# Patient Record
Sex: FEMALE | Race: WHITE | NOT HISPANIC OR LATINO | Employment: OTHER | URBAN - METROPOLITAN AREA
[De-identification: names, ages, dates, MRNs, and addresses within clinical notes are randomized per-mention and may not be internally consistent; named-entity substitution may affect disease eponyms.]

---

## 2019-01-24 ENCOUNTER — OFFICE VISIT (OUTPATIENT)
Dept: OBSTETRICS AND GYNECOLOGY | Facility: CLINIC | Age: 54
End: 2019-01-24

## 2019-01-24 VITALS
HEIGHT: 64 IN | BODY MASS INDEX: 29.37 KG/M2 | WEIGHT: 172 LBS | SYSTOLIC BLOOD PRESSURE: 124 MMHG | DIASTOLIC BLOOD PRESSURE: 82 MMHG

## 2019-01-24 DIAGNOSIS — Z01.419 PAP SMEAR, LOW-RISK: Primary | ICD-10-CM

## 2019-01-24 DIAGNOSIS — Z11.51 SPECIAL SCREENING EXAMINATION FOR HUMAN PAPILLOMAVIRUS (HPV): ICD-10-CM

## 2019-01-24 DIAGNOSIS — Z13.9 SCREENING FOR CONDITION: ICD-10-CM

## 2019-01-24 DIAGNOSIS — N94.2 VAGINISMUS: ICD-10-CM

## 2019-01-24 DIAGNOSIS — N95.2 VAGINAL ATROPHY: ICD-10-CM

## 2019-01-24 PROBLEM — J30.2 SEASONAL ALLERGIES: Status: ACTIVE | Noted: 2019-01-24

## 2019-01-24 LAB
BILIRUB BLD-MCNC: NEGATIVE MG/DL
CLARITY, POC: CLEAR
COLOR UR: YELLOW
GLUCOSE UR STRIP-MCNC: NEGATIVE MG/DL
KETONES UR QL: NEGATIVE
LEUKOCYTE EST, POC: NEGATIVE
NITRITE UR-MCNC: NEGATIVE MG/ML
PH UR: 6 [PH] (ref 5–8)
PROT UR STRIP-MCNC: NEGATIVE MG/DL
RBC # UR STRIP: NEGATIVE /UL
SP GR UR: 1.03 (ref 1–1.03)
UROBILINOGEN UR QL: NORMAL

## 2019-01-24 PROCEDURE — 99386 PREV VISIT NEW AGE 40-64: CPT | Performed by: OBSTETRICS & GYNECOLOGY

## 2019-01-24 PROCEDURE — 81002 URINALYSIS NONAUTO W/O SCOPE: CPT | Performed by: OBSTETRICS & GYNECOLOGY

## 2019-01-24 PROCEDURE — 99213 OFFICE O/P EST LOW 20 MIN: CPT | Performed by: OBSTETRICS & GYNECOLOGY

## 2019-01-24 RX ORDER — CETIRIZINE HYDROCHLORIDE 10 MG/1
10 TABLET ORAL DAILY
COMMUNITY

## 2019-01-24 RX ORDER — ERGOCALCIFEROL (VITAMIN D2) 10 MCG
400 TABLET ORAL DAILY
COMMUNITY
End: 2021-03-17

## 2019-01-24 RX ORDER — UBIDECARENONE 75 MG
50 CAPSULE ORAL DAILY
COMMUNITY
End: 2021-03-17

## 2019-01-24 RX ORDER — INFLUENZA A VIRUS A/SINGAPORE/GP1908/2015 IVR-180 (H1N1) ANTIGEN (MDCK CELL DERIVED, PROPIOLACTONE INACTIVATED), INFLUENZA A VIRUS A/NORTH CAROLINA/04/2016 (H3N2) HEMAGGLUTININ ANTIGEN (MDCK CELL DERIVED, PROPIOLACTONE INACTIVATED), INFLUENZA B VIRUS B/IOWA/06/2017 HEMAGGLUTININ ANTIGEN (MDCK CELL DERIVED, PROPIOLACTONE INACTIVATED), INFLUENZA B VIRUS B/SINGAPORE/INFTT-16-0610/2016 HEMAGGLUTININ ANTIGEN (MDCK CELL DERIVED, PROPIOLACTONE INACTIVATED) 15; 15; 15; 15 UG/.5ML; UG/.5ML; UG/.5ML; UG/.5ML
INJECTION, SUSPENSION INTRAMUSCULAR
Refills: 0 | COMMUNITY
Start: 2018-10-17 | End: 2021-03-17

## 2019-01-24 RX ORDER — ESTRADIOL 0.1 MG/G
1 CREAM VAGINAL 2 TIMES WEEKLY
Qty: 45 G | Refills: 6 | Status: SHIPPED | OUTPATIENT
Start: 2019-01-24 | End: 2020-02-07

## 2019-01-24 NOTE — PROGRESS NOTES
GYN Annual Exam     CC- Here for annual exam.     Rosemary Sharma is a 53 y.o. female new patient who presents for annual well woman exam. She had an ablation in  and had regular but lighter cycles. Stopped cycles 2 years ago. She is not any HRT. She have severe pain with sex. Pain is worse in the front. They have tried lubricant without much benefit.       OB History      Para Term  AB Living    1       1      SAB TAB Ectopic Molar Multiple Live Births      1                Obstetric Comments    1 VIP          Menarche:10  Menopause:51  HRT:none  Current contraception: post menopausal status and tubal ligation  History of abnormal Pap smear: no  History of abnormal mammogram: no  Family history of uterine, colon or ovarian cancer: yes - MGF colon > 50  Family history of breast cancer: no  STD's: none  Last pap smear:> 5 years  Ablation        Health Maintenance   Topic Date Due   • ANNUAL PHYSICAL  1966   • TDAP/TD VACCINES (1 - Tdap) 1982   • ZOSTER VACCINE (1 of 2) 2013   • INFLUENZA VACCINE  2018   • HEPATITIS C SCREENING  2019   • PAP SMEAR  2019   • COLONOSCOPY  2019       Past Medical History:   Diagnosis Date   • Seasonal allergies 2019       Past Surgical History:   Procedure Laterality Date   • CYST REMOVAL      arm & leg   • D&C WITH SUCTION      VIP x 1   • ENDOMETRIAL ABLATION     • EYE SURGERY     • TUBAL ABDOMINAL LIGATION           Current Outpatient Medications:   •  cetirizine (zyrTEC) 10 MG tablet, Take 10 mg by mouth Daily., Disp: , Rfl:   •  vitamin B-12 (CYANOCOBALAMIN) 100 MCG tablet, Take 50 mcg by mouth Daily., Disp: , Rfl:   •  Vitamin D, Cholecalciferol, (CHOLECALCIFEROL) 400 units tablet, Take 400 Units by mouth Daily., Disp: , Rfl:   •  vitamin E 100 UNIT capsule, Take 100 Units by mouth Daily., Disp: , Rfl:   •  estradiol (ESTRACE) 0.1 MG/GM vaginal cream, Insert 1 g into the vagina 2 (Two) Times a Week., Disp: 45 g,  "Rfl: 6  •  FLUCELVAX QUADRIVALENT 0.5 ML suspension prefilled syringe injection, TO BE ADMINISTERED BY PHARMACIST FOR IMMUNIZATION, Disp: , Rfl: 0    No Known Allergies    Social History     Tobacco Use   • Smoking status: Never Smoker   • Smokeless tobacco: Never Used   Substance Use Topics   • Alcohol use: No     Frequency: Never   • Drug use: No       Family History   Problem Relation Age of Onset   • Colon cancer Maternal Grandfather    • Breast cancer Neg Hx    • Ovarian cancer Neg Hx    • Deep vein thrombosis Neg Hx    • Pulmonary embolism Neg Hx        Review of Systems   Constitutional: Negative for appetite change, fatigue, fever and unexpected weight change.   Respiratory: Negative for cough and shortness of breath.    Cardiovascular: Negative for chest pain and palpitations.   Gastrointestinal: Negative for abdominal distention, abdominal pain, constipation, diarrhea and nausea.   Endocrine: Negative for cold intolerance and heat intolerance.   Genitourinary: Positive for dyspareunia and vaginal pain. Negative for dysuria, menstrual problem, pelvic pain and vaginal discharge.   Skin: Negative for color change and rash.   Neurological: Negative for headaches.   Psychiatric/Behavioral: Negative for dysphoric mood. The patient is not nervous/anxious.        /82   Ht 162.6 cm (64\")   Wt 78 kg (172 lb)   BMI 29.52 kg/m²     Physical Exam   Constitutional: She is oriented to person, place, and time. She appears well-developed and well-nourished.   HENT:   Head: Normocephalic and atraumatic.   Eyes: Conjunctivae are normal. No scleral icterus.   Neck: Neck supple. No thyromegaly present.   Cardiovascular: Normal rate and regular rhythm.   Pulmonary/Chest: Effort normal and breath sounds normal. Right breast exhibits no inverted nipple, no mass, no nipple discharge, no skin change and no tenderness. Left breast exhibits no inverted nipple, no mass, no nipple discharge, no skin change and no tenderness. "   Abdominal: Soft. Bowel sounds are normal. She exhibits no distension and no mass. There is no tenderness. There is no rebound and no guarding. No hernia.   Genitourinary: Uterus normal. Pelvic exam was performed with patient supine. There is no rash, tenderness or lesion on the right labia. There is no rash, tenderness or lesion on the left labia. Cervix exhibits no motion tenderness, no discharge and no friability. Right adnexum displays no mass, no tenderness and no fullness. Left adnexum displays no mass, no tenderness and no fullness. No erythema, tenderness or bleeding in the vagina. No foreign body in the vagina. No signs of injury around the vagina. No vaginal discharge found.   Genitourinary Comments: Moderate atrophy  + LPS   Neurological: She is alert and oriented to person, place, and time.   Skin: Skin is warm and dry.   Psychiatric: She has a normal mood and affect. Her behavior is normal. Judgment and thought content normal.   Nursing note and vitals reviewed.         Assessment/Plan    1) GYN HM: check pap/HPV   SBE demonstrated and encouraged.  2) STD screening: declines Condoms encouraged.  3) Bone health - Weight bearing exercise, dietary calcium recommendations and vitamin D reviewed.   4) Diet and Exercise discussed  5) Smoking Status: No   6) Dyspareunia- vaginismus. Pt wants to wait on pelvic floor PT and see if Estrace helps.   7)MMG: schedule , pt prefers [facility]  8) DEXA- schedule. Prefer [facility]  9)C scope- UTD age 48, repeat age 58  10) Atrophy- Patient counseled that vaginal estrogen rings, creams and tablets are available and highly effective at treating local vaginal symptoms such as atrophy and vaginal dryness.  Vaginal estrogen does not cause uterine overgrowth and does NOT require a progestogen to protect the uterus.  Very small amounts of estrogen are absorbed systemically.  For patients with a history of an estrogen dependent cancer such as breast cancer,  the decision to use local estrogen for local vaginal symptoms should be made after consultation with her oncologist.  Possible side effects include local irritation or burning and/or vaginal bleeding and should always be reported.     Follow up prn and 1 year       Rosemary was seen today for gynecologic exam.    Diagnoses and all orders for this visit:    Pap smear, low-risk  -     Pap IG, HPV-hr    Screening for condition  -     POC Urinalysis Dipstick  -     Mammo Screening Bilateral With CAD; Future  -     DEXA Bone Density Axial; Future    Special screening examination for human papillomavirus (HPV)  -     Pap IG, HPV-hr    Vaginismus  -     Ambulatory Referral to Physical Therapy Pelvic Floor    Vaginal atrophy    Other orders  -     estradiol (ESTRACE) 0.1 MG/GM vaginal cream; Insert 1 g into the vagina 2 (Two) Times a Week.        Nora Ramírez MD  1/23/19  8:50 PM

## 2019-01-29 ENCOUNTER — APPOINTMENT (OUTPATIENT)
Dept: BONE DENSITY | Facility: HOSPITAL | Age: 54
End: 2019-01-29
Attending: OBSTETRICS & GYNECOLOGY

## 2019-01-29 ENCOUNTER — HOSPITAL ENCOUNTER (OUTPATIENT)
Dept: MAMMOGRAPHY | Facility: HOSPITAL | Age: 54
Discharge: HOME | End: 2019-01-29
Attending: OBSTETRICS & GYNECOLOGY | Admitting: OBSTETRICS & GYNECOLOGY

## 2019-01-29 DIAGNOSIS — Z13.9 SCREENING FOR CONDITION: ICD-10-CM

## 2019-01-29 LAB
CYTOLOGIST CVX/VAG CYTO: ABNORMAL
CYTOLOGY CVX/VAG DOC THIN PREP: ABNORMAL
DX ICD CODE: ABNORMAL
HIV 1 & 2 AB SER-IMP: ABNORMAL
HPV I/H RISK 1 DNA CVX QL PROBE+SIG AMP: POSITIVE
Lab: ABNORMAL
OTHER STN SPEC: ABNORMAL
PATH REPORT.FINAL DX SPEC: ABNORMAL
STAT OF ADQ CVX/VAG CYTO-IMP: ABNORMAL

## 2019-01-29 PROCEDURE — 77080 DXA BONE DENSITY AXIAL: CPT

## 2019-01-29 PROCEDURE — 77067 SCR MAMMO BI INCL CAD: CPT

## 2019-01-29 PROCEDURE — 77063 BREAST TOMOSYNTHESIS BI: CPT

## 2019-01-30 NOTE — PROGRESS NOTES
PIP= pap is normal but HPV is positive. She needs both repeated in 1 year and if either are positive she will need colpo. Please place in colpo recall.

## 2019-02-04 ENCOUNTER — TELEPHONE (OUTPATIENT)
Dept: OBSTETRICS AND GYNECOLOGY | Facility: CLINIC | Age: 54
End: 2019-02-04

## 2019-02-05 NOTE — TELEPHONE ENCOUNTER
2/4/19- pt has osteopenia with a low risk of fracture. She needs daily calcium & vit D and repeat DEXA in 2-3 years.   Nora Ramírez MD

## 2019-05-29 ENCOUNTER — TELEPHONE (OUTPATIENT)
Dept: OBSTETRICS AND GYNECOLOGY | Facility: CLINIC | Age: 54
End: 2019-05-29

## 2019-05-29 RX ORDER — FLUCONAZOLE 200 MG/1
200 TABLET ORAL DAILY
Qty: 3 TABLET | Refills: 0 | Status: SHIPPED | OUTPATIENT
Start: 2019-05-29 | End: 2019-06-01

## 2019-05-29 NOTE — TELEPHONE ENCOUNTER
Patient has yeast infection, red, itchy and swollen with no discharge. She has tried Monistat otc and it didn't help. She would like to know if you can call something else in or if you can work her in to be treated. Her pharmacy is [facility] in .

## 2019-05-29 NOTE — TELEPHONE ENCOUNTER
I called in Diflucan 200 mg x 3 days. If this does not help she will need an appt. I called it in to the [facility] in her chart. AKR

## 2020-01-30 ENCOUNTER — TRANSCRIBE ORDERS (OUTPATIENT)
Dept: ADMINISTRATIVE | Facility: HOSPITAL | Age: 55
End: 2020-01-30

## 2020-01-30 ENCOUNTER — TRANSCRIBE ORDERS (OUTPATIENT)
Dept: OBSTETRICS AND GYNECOLOGY | Facility: CLINIC | Age: 55
End: 2020-01-30

## 2020-01-30 DIAGNOSIS — Z12.39 SCREENING BREAST EXAMINATION: Primary | ICD-10-CM

## 2020-02-07 ENCOUNTER — OFFICE VISIT (OUTPATIENT)
Dept: OBSTETRICS AND GYNECOLOGY | Facility: CLINIC | Age: 55
End: 2020-02-07

## 2020-02-07 VITALS
HEIGHT: 64 IN | BODY MASS INDEX: 26.8 KG/M2 | SYSTOLIC BLOOD PRESSURE: 120 MMHG | WEIGHT: 157 LBS | DIASTOLIC BLOOD PRESSURE: 72 MMHG

## 2020-02-07 DIAGNOSIS — L29.2 VULVAR ITCHING: ICD-10-CM

## 2020-02-07 DIAGNOSIS — Z78.0 OSTEOPENIA AFTER MENOPAUSE: ICD-10-CM

## 2020-02-07 DIAGNOSIS — Z01.419 ROUTINE GYNECOLOGICAL EXAMINATION: ICD-10-CM

## 2020-02-07 DIAGNOSIS — M85.80 OSTEOPENIA AFTER MENOPAUSE: ICD-10-CM

## 2020-02-07 DIAGNOSIS — Z01.419 PAP SMEAR, LOW-RISK: Primary | ICD-10-CM

## 2020-02-07 DIAGNOSIS — Z79.890 HORMONE REPLACEMENT THERAPY (HRT): ICD-10-CM

## 2020-02-07 DIAGNOSIS — B97.7 HPV (HUMAN PAPILLOMA VIRUS) INFECTION: ICD-10-CM

## 2020-02-07 DIAGNOSIS — Z13.9 SCREENING FOR CONDITION: ICD-10-CM

## 2020-02-07 LAB
BILIRUB BLD-MCNC: NEGATIVE MG/DL
CLARITY, POC: CLEAR
COLOR UR: YELLOW
GLUCOSE UR STRIP-MCNC: NEGATIVE MG/DL
KETONES UR QL: ABNORMAL
LEUKOCYTE EST, POC: NEGATIVE
NITRITE UR-MCNC: NEGATIVE MG/ML
PH UR: 6 [PH] (ref 5–8)
PROT UR STRIP-MCNC: NEGATIVE MG/DL
RBC # UR STRIP: NEGATIVE /UL
SP GR UR: 1 (ref 1–1.03)
UROBILINOGEN UR QL: NORMAL

## 2020-02-07 PROCEDURE — 99396 PREV VISIT EST AGE 40-64: CPT | Performed by: OBSTETRICS & GYNECOLOGY

## 2020-02-07 PROCEDURE — 81002 URINALYSIS NONAUTO W/O SCOPE: CPT | Performed by: OBSTETRICS & GYNECOLOGY

## 2020-02-07 PROCEDURE — 99213 OFFICE O/P EST LOW 20 MIN: CPT | Performed by: OBSTETRICS & GYNECOLOGY

## 2020-02-07 RX ORDER — CLOTRIMAZOLE AND BETAMETHASONE DIPROPIONATE 10; .64 MG/G; MG/G
CREAM TOPICAL 2 TIMES DAILY
Qty: 45 G | Refills: 0 | Status: SHIPPED | OUTPATIENT
Start: 2020-02-07 | End: 2022-06-28

## 2020-02-07 NOTE — PROGRESS NOTES
"GYN Annual Exam     CC- Here for annual exam.     Rosemary Sharma is a 54 y.o. female established pt who presents for annual well woman exam and to discuss her HRT. She had an ablation in  and had regular but lighter cycles. Stopped cycles 3 years ago. No  VB.   She was prescribed vaginal estrogen cream last year for pain during intercourse. She did not like the vaginal estrogen and found the cream to be too messy. She did not pursue pelvic floor PT. She did not call and tell me that the cream was not working but instead she decided to go to an HRT clinic as she had a friend who tried it and did well. She is seeing [facility] for compounded HRT and is doing well. She is not having pain with sex. She is on an estrogen pellet and \"some form of testosterone and progesterone\" She is concerned about the cost and we had a long and junior discussion about the limitation and pitfalls of having therapies that are not covered by insurance or regulated by the FDA. She is interested in discussing this further when she is due for her next HRT round. She also complains of some intermittent external vulvar and perirectal itching. She denies any discharge. Her pap last year was normal but she had + HPV.     OB History        1    Para        Term                AB   1    Living   0       SAB        TAB   1    Ectopic        Molar        Multiple        Live Births              Obstetric Comments   1 VIP             Menarche:10  Menopause:51  HRT: yes, x 1 year  Current contraception: post menopausal status and tubal ligation  History of abnormal Pap smear: yes, normal pap + HPV 2019  History of abnormal mammogram: no  Family history of uterine, colon or ovarian cancer: yes - MGF colon > 50  Family history of breast cancer: no  STD's: none  Last pap smear: 2019- normal pap + HPV  Ablation        Health Maintenance   Topic Date Due   • ANNUAL PHYSICAL  1966   • TDAP/TD VACCINES (1 - Tdap) 1974   • " ZOSTER VACCINE (1 of 2) 06/03/2013   • HEPATITIS C SCREENING  01/24/2019   • COLONOSCOPY  01/24/2019   • INFLUENZA VACCINE  08/01/2019   • MAMMOGRAM  01/29/2021   • Annual Gynecologic Pelvic and Breast Exam  02/08/2021   • PAP SMEAR  01/24/2022       Past Medical History:   Diagnosis Date   • Abnormal Pap smear of cervix 01/2019    nl pap + HPV   • Osteopenia    • Seasonal allergies 1/24/2019       Past Surgical History:   Procedure Laterality Date   • CYST REMOVAL      arm & leg   • D&C WITH SUCTION      VIP x 1   • ENDOMETRIAL ABLATION  2010   • EYE SURGERY     • TUBAL ABDOMINAL LIGATION           Current Outpatient Medications:   •  calcium carbonate-cholecalciferol 500-400 MG-UNIT tablet tablet, Take  by mouth Daily., Disp: , Rfl:   •  cetirizine (zyrTEC) 10 MG tablet, Take 10 mg by mouth Daily., Disp: , Rfl:   •  clotrimazole-betamethasone (LOTRISONE) 1-0.05 % cream, Apply  topically to the appropriate area as directed 2 (Two) Times a Day., Disp: 45 g, Rfl: 0  •  FLUCELVAX QUADRIVALENT 0.5 ML suspension prefilled syringe injection, TO BE ADMINISTERED BY PHARMACIST FOR IMMUNIZATION, Disp: , Rfl: 0  •  vitamin B-12 (CYANOCOBALAMIN) 100 MCG tablet, Take 50 mcg by mouth Daily., Disp: , Rfl:   •  Vitamin D, Cholecalciferol, (CHOLECALCIFEROL) 400 units tablet, Take 400 Units by mouth Daily., Disp: , Rfl:     No Known Allergies    Social History     Tobacco Use   • Smoking status: Never Smoker   • Smokeless tobacco: Never Used   Substance Use Topics   • Alcohol use: No     Frequency: Never   • Drug use: No       Family History   Problem Relation Age of Onset   • Colon cancer Maternal Grandfather    • Breast cancer Neg Hx    • Ovarian cancer Neg Hx    • Deep vein thrombosis Neg Hx    • Pulmonary embolism Neg Hx        Review of Systems   Constitutional: Negative for appetite change, fatigue, fever and unexpected weight change.   Respiratory: Negative for cough and shortness of breath.    Cardiovascular: Negative for  "chest pain and palpitations.   Gastrointestinal: Negative for abdominal distention, abdominal pain, constipation, diarrhea and nausea.   Endocrine: Negative for cold intolerance and heat intolerance.   Genitourinary: Positive for vaginal pain (itching). Negative for dyspareunia, dysuria, menstrual problem, pelvic pain and vaginal discharge.   Skin: Negative for color change and rash.   Neurological: Negative for headaches.   Psychiatric/Behavioral: Negative for dysphoric mood. The patient is not nervous/anxious.        /72   Ht 162.6 cm (64.02\")   Wt 71.2 kg (157 lb)   Breastfeeding No   BMI 26.94 kg/m²     Physical Exam   Constitutional: She is oriented to person, place, and time. She appears well-developed and well-nourished.   HENT:   Head: Normocephalic and atraumatic.   Eyes: Conjunctivae are normal. No scleral icterus.   Neck: Neck supple. No thyromegaly present.   Cardiovascular: Normal rate and regular rhythm.   Pulmonary/Chest: Effort normal and breath sounds normal. Right breast exhibits no inverted nipple, no mass, no nipple discharge, no skin change and no tenderness. Left breast exhibits no inverted nipple, no mass, no nipple discharge, no skin change and no tenderness.   Abdominal: Soft. Bowel sounds are normal. She exhibits no distension and no mass. There is no tenderness. There is no rebound and no guarding. No hernia.   Genitourinary: Uterus normal. Pelvic exam was performed with patient supine. There is no rash, tenderness or lesion on the right labia. There is no rash, tenderness or lesion on the left labia. Cervix exhibits no motion tenderness, no discharge and no friability. Right adnexum displays no mass, no tenderness and no fullness. Left adnexum displays no mass, no tenderness and no fullness. No erythema, tenderness or bleeding in the vagina. No foreign body in the vagina. No signs of injury around the vagina. No vaginal discharge found.   Genitourinary Comments:  No lesions c/w " LS  Mild atrophy   Neurological: She is alert and oriented to person, place, and time.   Skin: Skin is warm and dry.   Psychiatric: She has a normal mood and affect. Her behavior is normal. Judgment and thought content normal.   Nursing note and vitals reviewed.         Assessment/Plan    1) GYN HM: check pap/HPV. Pt had normal Pap with positive HPV last year.  We discussed that if either are abnormal, she will need colposcopy.   SBE demonstrated and encouraged.  2) STD screening: declines Condoms encouraged.  3) Bone health - Weight bearing exercise, dietary calcium recommendations and vitamin D reviewed.   4) Diet and Exercise discussed  5) Smoking Status: No   6) Dyspareunia- resolved   7)MMG: UTD 1/2019, BI-RADS 1.  Scheduled , pt prefers [facility]  8) DEXA-UTD 1/2019- osteopenia with low risk of fracture.  Repeat DEXA in 2 to 3 years.  9)C scope- UTD age 50, repeat age 58  10) HRT- Patient counseled that hormone treatment should be used to help with specific symptoms related to menopause such as hot flashes, night sweats and vaginal atrophy.  Hormones should not be given for “general wellbeing” or to avoid aging. The lowest dose hormone that treats symptoms should be used for the shortest amount of time possible.  Although estrogen is the most effective treatment for hot flashes, other non hormonal options exist (such as Brisdelle or venlafaxine) and should also be considered.  Anyone with an intact uterus should also receive progestogen to prevent uterine overgrowth that can lead to uterine cancer.  All hormone therapy, whether it is synthetic or bio identical, can lead to increased risk of stroke, heart attack and thromboembolic diseases.  These adverse events are more likely to develop in the first year of use and in patients who are older than the typical menopausal age.  Risks of estrogen dependent cancers such as breast cancer increase with prolonged use.  Attempts to wean hormone therapy should  be discussed annually and particularly after three to five years of use.  Any side effects such as vaginal bleeding or pain should be reported immediately.  Patient was counseled that evidence is lacking to support superiority claims of compounded bioidentical hormones over conventional menopausal hormone therapy.  Compounded or “customized” hormones pose additional risks above those known for FDA approved therapies, as compounded hormones have variable purity and potency.  Thee variability and lack of regulation in these therapies make both under dosage and over dosage possible.  Despite claims to the contrary, evidence is inadequate to support increased efficacy or safety for individualized hormone therapy regimen based on salivary, serum or urinary testing.  Any side effects such as vaginal bleeding or pain should be reported immediately.  She was given information from [employer] on HRT and will make appt in late April to discuss possibly changing to an FDA approved HRT.   11) Vaginal irritation- no discharge, skin looks normal. Rx triamcinolone BID x 7 days. If no benefit, consider biopsy.   12)Parts of this document have been copied or forwarded from her previous visits and have been reviewed, updated and edited as indicated.    Follow up prn and 1 year       Rosemary was seen today for gynecologic exam.    Diagnoses and all orders for this visit:    Pap smear, low-risk  -     Pap IG, HPV-hr    Screening for condition  -     POC Urinalysis Dipstick    Routine gynecological examination  -     Pap IG, HPV-hr    Hormone replacement therapy (HRT)    HPV (human papilloma virus) infection    Vulvar itching    Osteopenia after menopause    Other orders  -     clotrimazole-betamethasone (LOTRISONE) 1-0.05 % cream; Apply  topically to the appropriate area as directed 2 (Two) Times a Day.        Nora Ramírez MD  11:52 PM   2/7/2020

## 2020-02-08 PROBLEM — B97.7 HPV (HUMAN PAPILLOMA VIRUS) INFECTION: Status: ACTIVE | Noted: 2020-02-08

## 2020-02-08 PROBLEM — L29.2 VULVAR ITCHING: Status: ACTIVE | Noted: 2020-02-08

## 2020-02-08 PROBLEM — Z78.0 OSTEOPENIA AFTER MENOPAUSE: Status: ACTIVE | Noted: 2020-02-08

## 2020-02-08 PROBLEM — M85.80 OSTEOPENIA AFTER MENOPAUSE: Status: ACTIVE | Noted: 2020-02-08

## 2020-02-08 PROBLEM — Z79.890 HORMONE REPLACEMENT THERAPY (HRT): Status: ACTIVE | Noted: 2020-02-08

## 2020-02-08 PROBLEM — N94.2 VAGINISMUS: Status: RESOLVED | Noted: 2019-01-24 | Resolved: 2020-02-08

## 2020-02-08 PROBLEM — N95.2 VAGINAL ATROPHY: Status: RESOLVED | Noted: 2019-01-24 | Resolved: 2020-02-08

## 2020-02-11 LAB
CYTOLOGIST CVX/VAG CYTO: ABNORMAL
CYTOLOGY CVX/VAG DOC CYTO: ABNORMAL
CYTOLOGY CVX/VAG DOC THIN PREP: ABNORMAL
DX ICD CODE: ABNORMAL
HIV 1 & 2 AB SER-IMP: ABNORMAL
HPV I/H RISK 1 DNA CVX QL PROBE+SIG AMP: POSITIVE
OTHER STN SPEC: ABNORMAL
STAT OF ADQ CVX/VAG CYTO-IMP: ABNORMAL

## 2020-02-12 ENCOUNTER — HOSPITAL ENCOUNTER (OUTPATIENT)
Dept: MAMMOGRAPHY | Facility: HOSPITAL | Age: 55
Discharge: HOME | End: 2020-02-12
Admitting: OBSTETRICS & GYNECOLOGY

## 2020-02-12 DIAGNOSIS — Z12.39 SCREENING BREAST EXAMINATION: ICD-10-CM

## 2020-02-12 PROCEDURE — 77067 SCR MAMMO BI INCL CAD: CPT

## 2020-02-12 PROCEDURE — 77063 BREAST TOMOSYNTHESIS BI: CPT

## 2020-02-12 NOTE — PROGRESS NOTES
PIP= normal pap but + HPV for the second year in a row, needs colpo, please schedule in the next 4 weeks

## 2020-03-20 ENCOUNTER — TELEPHONE (OUTPATIENT)
Dept: OBSTETRICS AND GYNECOLOGY | Facility: CLINIC | Age: 55
End: 2020-03-20

## 2020-03-26 RX ORDER — ESTRADIOL 0.05 MG/D
1 FILM, EXTENDED RELEASE TRANSDERMAL 2 TIMES WEEKLY
Qty: 8 PATCH | Refills: 3 | Status: SHIPPED | OUTPATIENT
Start: 2020-03-26 | End: 2020-07-03

## 2020-07-03 RX ORDER — ESTRADIOL 0.05 MG/D
1 FILM, EXTENDED RELEASE TRANSDERMAL 2 TIMES WEEKLY
Qty: 8 PATCH | Refills: 3 | Status: SHIPPED | OUTPATIENT
Start: 2020-07-06 | End: 2020-11-11

## 2020-07-30 ENCOUNTER — OFFICE VISIT (OUTPATIENT)
Dept: OBSTETRICS AND GYNECOLOGY | Facility: CLINIC | Age: 55
End: 2020-07-30

## 2020-07-30 VITALS
SYSTOLIC BLOOD PRESSURE: 116 MMHG | DIASTOLIC BLOOD PRESSURE: 70 MMHG | HEIGHT: 64 IN | WEIGHT: 144.3 LBS | BODY MASS INDEX: 24.63 KG/M2

## 2020-07-30 DIAGNOSIS — B97.7 HPV (HUMAN PAPILLOMA VIRUS) INFECTION: Primary | ICD-10-CM

## 2020-07-30 DIAGNOSIS — Z13.9 SCREENING FOR CONDITION: ICD-10-CM

## 2020-07-30 LAB
B-HCG UR QL: NEGATIVE
BILIRUB BLD-MCNC: NEGATIVE MG/DL
CLARITY, POC: CLEAR
COLOR UR: YELLOW
GLUCOSE UR STRIP-MCNC: NEGATIVE MG/DL
INTERNAL NEGATIVE CONTROL: NEGATIVE
INTERNAL POSITIVE CONTROL: POSITIVE
KETONES UR QL: NEGATIVE
LEUKOCYTE EST, POC: NEGATIVE
Lab: 55
NITRITE UR-MCNC: NEGATIVE MG/ML
PH UR: 5 [PH] (ref 5–8)
PROT UR STRIP-MCNC: NEGATIVE MG/DL
RBC # UR STRIP: NEGATIVE /UL
SP GR UR: 1 (ref 1–1.03)
UROBILINOGEN UR QL: NORMAL

## 2020-07-30 PROCEDURE — 57454 BX/CURETT OF CERVIX W/SCOPE: CPT | Performed by: OBSTETRICS & GYNECOLOGY

## 2020-07-30 PROCEDURE — 81002 URINALYSIS NONAUTO W/O SCOPE: CPT | Performed by: OBSTETRICS & GYNECOLOGY

## 2020-07-30 PROCEDURE — 81025 URINE PREGNANCY TEST: CPT | Performed by: OBSTETRICS & GYNECOLOGY

## 2020-07-30 RX ORDER — FLUTICASONE PROPIONATE 50 MCG
2 SPRAY, SUSPENSION (ML) NASAL DAILY
COMMUNITY
End: 2022-06-28

## 2020-07-30 NOTE — PROGRESS NOTES
Colposcopy Procedure Note    Procedures          Indications: Most recent Pap smear showed: normal pap + HPV x 2019 & 2020    Prior cervical/vaginal disease: normal exam without visible pathology  Prior cervical treatment: no treatment.  Contraception: post menopausal status and tubal ligation    Procedure Details   The risks and benefits of the procedure and Verbal informed consent obtained.  Pregnancy test: not done    Speculum placed in vagina and excellent visualization of cervix achieved, cervix swabbed x 3 with acetic acid solution and Lugol's solution     Findings:  Cervix: no mosaicism, no punctation and acetowhite lesion(s) noted at 7 & 11 o'clock; cervix swabbed with Lugol's solution, endocervical curettage performed, cervical biopsies taken at 7 & 11 o'clock, specimen labelled and sent to pathology and hemostasis achieved with Monsel's solution.  Vaginal inspection: normal without visible lesions.  Vulvar colposcopy: vulvar colposcopy not performed.  Colpo adequacy: was not adequate, only 50 % of TZ seen    Specimens: cervical bx 7 & 11, ECC    Colpo impression:  Normal to FOUZIA 1    Complications: none.   Patient tolerated procedure well.     Smoking Status: No      Plan:  Specimens labelled and sent to Pathology.  Will base further treatment on Pathology findings.  Treatment options discussed with patient.  Post biopsy instructions given to patient.  We will repeat her pap at her annual in 2/2021, if pap still abnormal, consider [facility]    Nora Ramírez MD   7/30/2020  10:06

## 2020-08-03 LAB
PATH REPORT.FINAL DX SPEC: NORMAL
PATH REPORT.GROSS SPEC: NORMAL
PATH REPORT.RELEVANT HX SPEC: NORMAL
PATH REPORT.SITE OF ORIGIN SPEC: NORMAL
PATHOLOGIST NAME: NORMAL
PAYMENT PROCEDURE: NORMAL

## 2020-11-11 RX ORDER — ESTRADIOL 0.05 MG/D
FILM, EXTENDED RELEASE TRANSDERMAL
Qty: 8 EACH | Refills: 2 | Status: SHIPPED | OUTPATIENT
Start: 2020-11-11 | End: 2021-02-09

## 2021-01-28 ENCOUNTER — TRANSCRIBE ORDERS (OUTPATIENT)
Dept: ADMINISTRATIVE | Facility: HOSPITAL | Age: 56
End: 2021-01-28

## 2021-01-28 DIAGNOSIS — Z12.39 SCREENING BREAST EXAMINATION: Primary | ICD-10-CM

## 2021-02-09 RX ORDER — ESTRADIOL 0.05 MG/D
FILM, EXTENDED RELEASE TRANSDERMAL
Qty: 8 EACH | Refills: 1 | Status: SHIPPED | OUTPATIENT
Start: 2021-02-09 | End: 2022-06-28

## 2021-02-15 ENCOUNTER — HOSPITAL ENCOUNTER (OUTPATIENT)
Dept: MAMMOGRAPHY | Facility: HOSPITAL | Age: 56
End: 2021-02-15

## 2021-03-17 ENCOUNTER — HOSPITAL ENCOUNTER (EMERGENCY)
Facility: HOSPITAL | Age: 56
Discharge: HOME | End: 2021-03-17
Attending: EMERGENCY MEDICINE | Admitting: EMERGENCY MEDICINE

## 2021-03-17 ENCOUNTER — APPOINTMENT (OUTPATIENT)
Dept: CT IMAGING | Facility: HOSPITAL | Age: 56
End: 2021-03-17

## 2021-03-17 ENCOUNTER — APPOINTMENT (OUTPATIENT)
Dept: ULTRASOUND IMAGING | Facility: HOSPITAL | Age: 56
End: 2021-03-17

## 2021-03-17 VITALS
HEIGHT: 64 IN | WEIGHT: 140 LBS | SYSTOLIC BLOOD PRESSURE: 121 MMHG | RESPIRATION RATE: 16 BRPM | DIASTOLIC BLOOD PRESSURE: 84 MMHG | TEMPERATURE: 98.1 F | BODY MASS INDEX: 23.9 KG/M2 | HEART RATE: 72 BPM | OXYGEN SATURATION: 100 %

## 2021-03-17 DIAGNOSIS — R10.32 LEFT LOWER QUADRANT PAIN: ICD-10-CM

## 2021-03-17 DIAGNOSIS — N99.85 POST ENDOMETRIAL ABLATION SYNDROME: Primary | ICD-10-CM

## 2021-03-17 DIAGNOSIS — N20.0 LEFT RENAL STONE: ICD-10-CM

## 2021-03-17 LAB
ALBUMIN SERPL-MCNC: 4.3 G/DL (ref 3.5–5.2)
ALBUMIN/GLOB SERPL: 1.5 G/DL
ALP SERPL-CCNC: 63 U/L (ref 39–117)
ALT SERPL W P-5'-P-CCNC: 12 U/L (ref 1–33)
ANION GAP SERPL CALCULATED.3IONS-SCNC: 12 MMOL/L (ref 5–15)
AST SERPL-CCNC: 14 U/L (ref 1–32)
BACTERIA UR QL AUTO: ABNORMAL /HPF
BASOPHILS # BLD AUTO: 0.07 10*3/MM3 (ref 0–0.2)
BASOPHILS NFR BLD AUTO: 0.8 % (ref 0–1.5)
BILIRUB SERPL-MCNC: 0.5 MG/DL (ref 0–1.2)
BILIRUB UR QL STRIP: NEGATIVE
BUN SERPL-MCNC: 10 MG/DL (ref 6–20)
BUN/CREAT SERPL: 11 (ref 7–25)
CALCIUM SPEC-SCNC: 10.3 MG/DL (ref 8.6–10.5)
CHLORIDE SERPL-SCNC: 102 MMOL/L (ref 98–107)
CLARITY UR: CLEAR
CO2 SERPL-SCNC: 24 MMOL/L (ref 22–29)
COLOR UR: YELLOW
CREAT SERPL-MCNC: 0.91 MG/DL (ref 0.57–1)
DEPRECATED RDW RBC AUTO: 43 FL (ref 37–54)
EOSINOPHIL # BLD AUTO: 0.05 10*3/MM3 (ref 0–0.4)
EOSINOPHIL NFR BLD AUTO: 0.5 % (ref 0.3–6.2)
ERYTHROCYTE [DISTWIDTH] IN BLOOD BY AUTOMATED COUNT: 12.5 % (ref 12.3–15.4)
GFR SERPL CREATININE-BSD FRML MDRD: 64 ML/MIN/1.73
GLOBULIN UR ELPH-MCNC: 2.9 GM/DL
GLUCOSE SERPL-MCNC: 95 MG/DL (ref 65–99)
GLUCOSE UR STRIP-MCNC: NEGATIVE MG/DL
HCT VFR BLD AUTO: 44.4 % (ref 34–46.6)
HGB BLD-MCNC: 14.3 G/DL (ref 12–15.9)
HGB UR QL STRIP.AUTO: ABNORMAL
HYALINE CASTS UR QL AUTO: ABNORMAL /LPF
IMM GRANULOCYTES # BLD AUTO: 0.01 10*3/MM3 (ref 0–0.05)
IMM GRANULOCYTES NFR BLD AUTO: 0.1 % (ref 0–0.5)
KETONES UR QL STRIP: ABNORMAL
LEUKOCYTE ESTERASE UR QL STRIP.AUTO: NEGATIVE
LYMPHOCYTES # BLD AUTO: 1.72 10*3/MM3 (ref 0.7–3.1)
LYMPHOCYTES NFR BLD AUTO: 18.6 % (ref 19.6–45.3)
MCH RBC QN AUTO: 29.9 PG (ref 26.6–33)
MCHC RBC AUTO-ENTMCNC: 32.2 G/DL (ref 31.5–35.7)
MCV RBC AUTO: 92.9 FL (ref 79–97)
MONOCYTES # BLD AUTO: 0.7 10*3/MM3 (ref 0.1–0.9)
MONOCYTES NFR BLD AUTO: 7.6 % (ref 5–12)
NEUTROPHILS NFR BLD AUTO: 6.69 10*3/MM3 (ref 1.7–7)
NEUTROPHILS NFR BLD AUTO: 72.4 % (ref 42.7–76)
NITRITE UR QL STRIP: NEGATIVE
PH UR STRIP.AUTO: 5.5 [PH] (ref 4.5–8)
PLATELET # BLD AUTO: 256 10*3/MM3 (ref 140–450)
PMV BLD AUTO: 11.4 FL (ref 6–12)
POTASSIUM SERPL-SCNC: 3.8 MMOL/L (ref 3.5–5.2)
PROT SERPL-MCNC: 7.2 G/DL (ref 6–8.5)
PROT UR QL STRIP: NEGATIVE
RBC # BLD AUTO: 4.78 10*6/MM3 (ref 3.77–5.28)
RBC # UR: ABNORMAL /HPF
REF LAB TEST METHOD: ABNORMAL
SODIUM SERPL-SCNC: 138 MMOL/L (ref 136–145)
SP GR UR STRIP: 1.03 (ref 1–1.03)
SQUAMOUS #/AREA URNS HPF: ABNORMAL /HPF
UROBILINOGEN UR QL STRIP: ABNORMAL
WBC # BLD AUTO: 9.24 10*3/MM3 (ref 3.4–10.8)
WBC UR QL AUTO: ABNORMAL /HPF

## 2021-03-17 PROCEDURE — 85025 COMPLETE CBC W/AUTO DIFF WBC: CPT | Performed by: PHYSICIAN ASSISTANT

## 2021-03-17 PROCEDURE — 99284 EMERGENCY DEPT VISIT MOD MDM: CPT

## 2021-03-17 PROCEDURE — 96375 TX/PRO/DX INJ NEW DRUG ADDON: CPT

## 2021-03-17 PROCEDURE — 74176 CT ABD & PELVIS W/O CONTRAST: CPT

## 2021-03-17 PROCEDURE — 80053 COMPREHEN METABOLIC PANEL: CPT | Performed by: PHYSICIAN ASSISTANT

## 2021-03-17 PROCEDURE — 99284 EMERGENCY DEPT VISIT MOD MDM: CPT | Performed by: PHYSICIAN ASSISTANT

## 2021-03-17 PROCEDURE — 76830 TRANSVAGINAL US NON-OB: CPT

## 2021-03-17 PROCEDURE — 81001 URINALYSIS AUTO W/SCOPE: CPT | Performed by: PHYSICIAN ASSISTANT

## 2021-03-17 PROCEDURE — 96374 THER/PROPH/DIAG INJ IV PUSH: CPT

## 2021-03-17 PROCEDURE — 25010000002 FENTANYL CITRATE (PF) 100 MCG/2ML SOLUTION: Performed by: EMERGENCY MEDICINE

## 2021-03-17 PROCEDURE — 25010000002 ONDANSETRON PER 1 MG: Performed by: EMERGENCY MEDICINE

## 2021-03-17 PROCEDURE — 76856 US EXAM PELVIC COMPLETE: CPT

## 2021-03-17 RX ORDER — ONDANSETRON 2 MG/ML
4 INJECTION INTRAMUSCULAR; INTRAVENOUS ONCE
Status: COMPLETED | OUTPATIENT
Start: 2021-03-17 | End: 2021-03-17

## 2021-03-17 RX ORDER — FENTANYL CITRATE 50 UG/ML
50 INJECTION, SOLUTION INTRAMUSCULAR; INTRAVENOUS ONCE
Status: COMPLETED | OUTPATIENT
Start: 2021-03-17 | End: 2021-03-17

## 2021-03-17 RX ORDER — SODIUM CHLORIDE 0.9 % (FLUSH) 0.9 %
10 SYRINGE (ML) INJECTION AS NEEDED
Status: DISCONTINUED | OUTPATIENT
Start: 2021-03-17 | End: 2021-03-17 | Stop reason: HOSPADM

## 2021-03-17 RX ADMIN — ONDANSETRON 4 MG: 2 INJECTION INTRAMUSCULAR; INTRAVENOUS at 13:18

## 2021-03-17 RX ADMIN — FENTANYL CITRATE 50 MCG: 50 INJECTION INTRAMUSCULAR; INTRAVENOUS at 13:18

## 2021-03-17 NOTE — ED PROVIDER NOTES
" EMERGENCY DEPARTMENT ENCOUNTER      Room Number: 09/09    History is provided by the patient, no translation services needed    HPI:    Chief complaint: Abdominal pain    Location: Left lower quadrant    Quality/Severity: 10/10, twisting    Timing/Duration: Pain began yesterday morning, woke patient from sleep    Modifying Factors: Alleviated with ibuprofen last night, not relieved with ibuprofen today.    Associated Symptoms: Positive for abdominal pain.  Patient states she normally has loose stools, denies any diarrhea, black, bloody, or mucousy stools, dysuria, hematuria, flank pain, urgency or frequency to urinate.  Denies fevers or chills, vaginal bleeding, vaginal discharge.    Narrative: Pt is a 55 y.o. female who presents complaining of left lower quadrant abdominal pain that started yesterday morning.  Patient has a PMH significant for uterine ablation and tubal ligation.  She is on HRT and is postmenopausal.  She states she woke from her sleep yesterday morning with severe left lower quadrant abdominal pain.  She states she went to the bathroom to see if she could alleviate it, she had a normal bowel movement.  She states her normal stools are loose and her stools yesterday were no different.  Had routine colonoscopy a few years ago, denies any diverticulosis.  Denies any fevers or chills, nausea, or vomiting.  Patient states \" it feels like something is twisted, and it feels like my ovary.\"      PMD: Nora Jaramillo APRN    REVIEW OF SYSTEMS  Review of Systems   Constitutional: Negative for chills and fever.   Respiratory: Negative for cough and shortness of breath.    Cardiovascular: Negative for chest pain and palpitations.   Gastrointestinal: Positive for abdominal pain. Negative for nausea and vomiting.   Musculoskeletal: Negative for arthralgias and myalgias.   Skin: Negative for rash and wound.   Neurological: Negative for dizziness and syncope.   Psychiatric/Behavioral: Negative for " confusion. The patient is not nervous/anxious.          PAST MEDICAL HISTORY  Active Ambulatory Problems     Diagnosis Date Noted   • Seasonal allergies 01/24/2019   • Hormone replacement therapy (HRT) 02/08/2020   • HPV (human papilloma virus) infection 02/08/2020   • Vulvar itching 02/08/2020   • Osteopenia after menopause 02/08/2020     Resolved Ambulatory Problems     Diagnosis Date Noted   • Vaginismus 01/24/2019   • Vaginal atrophy 01/24/2019     Past Medical History:   Diagnosis Date   • Abnormal Pap smear of cervix 01/2019   • Osteopenia        PAST SURGICAL HISTORY  Past Surgical History:   Procedure Laterality Date   • CYST REMOVAL      arm & leg   • D & C WITH SUCTION      VIP x 1   • ENDOMETRIAL ABLATION  2010   • EYE SURGERY     • TUBAL ABDOMINAL LIGATION         FAMILY HISTORY  Family History   Problem Relation Age of Onset   • Colon cancer Maternal Grandfather    • Breast cancer Neg Hx    • Ovarian cancer Neg Hx    • Deep vein thrombosis Neg Hx    • Pulmonary embolism Neg Hx        SOCIAL HISTORY  Social History     Socioeconomic History   • Marital status:      Spouse name: Not on file   • Number of children: Not on file   • Years of education: Not on file   • Highest education level: Not on file   Tobacco Use   • Smoking status: Never Smoker   • Smokeless tobacco: Never Used   Substance and Sexual Activity   • Alcohol use: No   • Drug use: No   • Sexual activity: Yes     Partners: Male     Birth control/protection: Surgical, Post-menopausal     Comment: BTL       ALLERGIES  Patient has no known allergies.      Current Facility-Administered Medications:   •  [COMPLETED] Insert peripheral IV, , , Once **AND** sodium chloride 0.9 % flush 10 mL, 10 mL, Intravenous, PRN, Kari Pickering PA-C    Current Outpatient Medications:   •  calcium carbonate-cholecalciferol 500-400 MG-UNIT tablet tablet, Take  by mouth Daily., Disp: , Rfl:   •  cetirizine (zyrTEC) 10 MG tablet, Take 10 mg by mouth  Daily., Disp: , Rfl:   •  clotrimazole-betamethasone (LOTRISONE) 1-0.05 % cream, Apply  topically to the appropriate area as directed 2 (Two) Times a Day., Disp: 45 g, Rfl: 0  •  estradiol (MINIVELLE, VIVELLE-DOT) 0.05 MG/24HR patch, APPLY ONE PATCH TO SKIN TWICE WEEKLY, Disp: 8 each, Rfl: 1  •  fluticasone (FLONASE) 50 MCG/ACT nasal spray, 2 sprays into the nostril(s) as directed by provider Daily., Disp: , Rfl:   •  progesterone (PROMETRIUM) 100 MG capsule, TAKE ONE CAPSULE BY MOUTH EVERY NIGHT AT BEDTIME, Disp: 90 capsule, Rfl: 0    PHYSICAL EXAM  ED Triage Vitals [03/17/21 1212]   Temp Heart Rate Resp BP SpO2   98 °F (36.7 °C) 91 18 133/91 100 %      Temp src Heart Rate Source Patient Position BP Location FiO2 (%)   Oral Monitor Lying Left arm --       Physical Exam  Vitals and nursing note reviewed.   Constitutional:       General: She is not in acute distress.     Appearance: She is well-developed and normal weight.   HENT:      Head: Normocephalic and atraumatic.   Eyes:      Conjunctiva/sclera: Conjunctivae normal.      Pupils: Pupils are equal, round, and reactive to light.   Cardiovascular:      Rate and Rhythm: Normal rate and regular rhythm.   Pulmonary:      Effort: Pulmonary effort is normal.      Breath sounds: Normal breath sounds.   Abdominal:      General: Bowel sounds are normal.      Palpations: Abdomen is soft.      Tenderness: There is no abdominal tenderness. There is no guarding or rebound.   Musculoskeletal:         General: Normal range of motion.      Cervical back: Normal range of motion and neck supple.   Skin:     General: Skin is warm and dry.   Neurological:      Mental Status: She is alert and oriented to person, place, and time.   Psychiatric:         Mood and Affect: Mood and affect normal.         Cognition and Memory: Memory normal.         Judgment: Judgment normal.           LAB RESULTS  Lab Results (last 24 hours)     Procedure Component Value Units Date/Time    CBC &  Differential [[id number]]  (Abnormal) Collected: 03/17/21 1316    Specimen: Blood Updated: 03/17/21 1330    Narrative:      The following orders were created for panel order CBC & Differential.  Procedure                               Abnormality         Status                     ---------                               -----------         ------                     CBC Auto Differential[[id number]]        Abnormal            Final result                 Please view results for these tests on the individual orders.    Comprehensive Metabolic Panel [[id number]] Collected: 03/17/21 1316    Specimen: Blood Updated: 03/17/21 1343     Glucose 95 mg/dL      BUN 10 mg/dL      Creatinine 0.91 mg/dL      Sodium 138 mmol/L      Potassium 3.8 mmol/L      Chloride 102 mmol/L      CO2 24.0 mmol/L      Calcium 10.3 mg/dL      Total Protein 7.2 g/dL      Albumin 4.30 g/dL      ALT (SGPT) 12 U/L      AST (SGOT) 14 U/L      Alkaline Phosphatase 63 U/L      Total Bilirubin 0.5 mg/dL      eGFR Non African Amer 64 mL/min/1.73      Globulin 2.9 gm/dL      A/G Ratio 1.5 g/dL      BUN/Creatinine Ratio 11.0     Anion Gap 12.0 mmol/L     Narrative:      GFR Normal >60  Chronic Kidney Disease <60  Kidney Failure <15      Urinalysis With Microscopic If Indicated (No Culture) - Urine, Clean Catch [[id number]]  (Abnormal) Collected: 03/17/21 1316    Specimen: Urine, Clean Catch Updated: 03/17/21 1338     Color, UA Yellow     Appearance, UA Clear     pH, UA 5.5     Specific Gravity, UA 1.029     Comment: Result obtained by Refractometer        Glucose, UA Negative     Ketones, UA 40 mg/dL (2+)     Bilirubin, UA Negative     Blood, UA Moderate (2+)     Protein, UA Negative     Leuk Esterase, UA Negative     Nitrite, UA Negative     Urobilinogen, UA 0.2 E.U./dL    CBC Auto Differential [[id number]]  (Abnormal) Collected: 03/17/21 1316    Specimen: Blood Updated: 03/17/21 1330     WBC 9.24 10*3/mm3      RBC 4.78 10*6/mm3      Hemoglobin 14.3 g/dL       Hematocrit 44.4 %      MCV 92.9 fL      MCH 29.9 pg      MCHC 32.2 g/dL      RDW 12.5 %      RDW-SD 43.0 fl      MPV 11.4 fL      Platelets 256 10*3/mm3      Neutrophil % 72.4 %      Lymphocyte % 18.6 %      Monocyte % 7.6 %      Eosinophil % 0.5 %      Basophil % 0.8 %      Immature Grans % 0.1 %      Neutrophils, Absolute 6.69 10*3/mm3      Lymphocytes, Absolute 1.72 10*3/mm3      Monocytes, Absolute 0.70 10*3/mm3      Eosinophils, Absolute 0.05 10*3/mm3      Basophils, Absolute 0.07 10*3/mm3      Immature Grans, Absolute 0.01 10*3/mm3     Urinalysis, Microscopic Only - Urine, Clean Catch [[id number]]  (Abnormal) Collected: 03/17/21 1316    Specimen: Urine, Clean Catch Updated: 03/17/21 1339     RBC, UA 13-20 /HPF      WBC, UA 0-2 /HPF      Bacteria, UA None Seen /HPF      Squamous Epithelial Cells, UA 0-2 /HPF      Hyaline Casts, UA None Seen /LPF      Methodology Manual Light Microscopy            I ordered the above labs and reviewed the results    RADIOLOGY  US Pelvis Transvaginal Non OB    Result Date: 3/17/2021  ULTRASOUND PELVIS, 03/17/2021  INDICATION: 55-year-old female presenting to the ED complaining of 2 day history left side pelvic pain. History of tubal ligation. History of endometrial ablation procedure in 2010.  TECHNIQUE: Initial pelvic survey was performed transabdominally. The remainder of the examination was performed endovaginally for adequate image detail.  FINDINGS: The uterus is normal in size. In the upper uterine fundus to left of midline there is a unilocular, mostly anechoic fluid collection centered on the endometrium measuring up to 1.7 cm in size.  The endometrial stripe is diffusely thin compatible with history of prior ablation. There is a tiny amount of anechoic fluid within the endometrial cavity of the lower uterine segment just above the cervix.  Both ovaries are normal in size and ultrasound appearance, each containing physiologic follicles. No dominant ovary cyst, adnexal  region mass or significant free pelvic fluid is identified. Limited Doppler evaluation documents bilateral ovary blood flow.  *  Uterus: 8.5 x 3.6 x 4.6 cm. *  Endometrium: Post ablation, 4 mm. *  Right ovary 1.4 x 1.0 x 1.2 cm. *  Left ovary 1.6 x 2.0 x 1.4 cm.      1.  No acute abnormality is identified within the pelvis. 2.  1.7 cm upper intrauterine fluid collection to the left of midline centered on the endometrium. This may be present within the endometrial cavity, although this is nonspecific. In the setting of prior tubal sterilization and endometrial ablation, post ablation tubal sterilization syndrome is a consideration (PATSS). 3.  Diffusely thickened endometrium compatible with prior ablation. 4.  Normal ovaries. No free pelvic fluid.  This report was finalized on 3/17/2021 3:08 PM by Dr. Srinivasan Izaguirre MD.      CT Abdomen Pelvis Stone Protocol    Result Date: 3/17/2021  CT Abdomen Pelvis WO INDICATION: 55-year-old female with pain in the left lower quadrant and hematuria. Symptoms since last night. History of uterine ablation and tubal ligation. Visible hematuria today. TECHNIQUE: CT of the abdomen and pelvis without IV contrast. Coronal and sagittal reconstructions were obtained.  Radiation dose reduction techniques included automated exposure control or exposure modulation based on body size. Count of known CT and cardiac nuc med studies performed in previous 12 months: 0. COMPARISON: Correlation made with ultrasound earlier today at 1422 hours FINDINGS: Abdomen: The included lung bases are clear. There is no pericardial effusion. Normal caliber aorta. The spleen and adrenal glands are negative and the pancreas is unremarkable. Negative gallbladder and the liver is unremarkable. The right kidney is negative and the visualized right ureter is unremarkable. There is a tiny 3 mm nonobstructing stone in the mid to lower pole left kidney. Visualized left ureter negative. There is a probable 3 mm  vascular calcification in close proximity to the left UVJ. No secondary signs of obstruction on the left. There is no adenopathy. Pelvis: Negative bladder. There is no drainable fluid collection. The bowel is nonobstructed and the appendix is normal. The uterus measures 8.1 cm long Axis. There is a lobulated margin of the uterine fundus and intimately associated with the upper margin of the endometrial stripe there is a crescentic fluid pocket measuring up to about 2.4 x 1.3 cm. Imaging findings are nonspecific but given the history of uterine ablation, loculated fluid associated with the endometrial canal could present similarly. No distinct air within the fluid to otherwise suggest infection. No inguinal adenopathy or fluid collection. No suspicious bone lesion.     1. Tiny 3 mm nonobstructing stone in the left kidney. No hydronephrosis of either kidney. 2. Normal appendix. 3. Nonspecific 2.4 cm fluid pocket within the uterus, favored to be related to the reported history of endometrial ablation procedure. This was better characterized on the preceding pelvic ultrasound earlier today. Postablation tubal sterilization syndrome (PATSS) is a differential consideration. Signer Name: Mejia Feldman MD  Signed: 3/17/2021 4:20 PM  Workstation Name: [id number]  [facility]      I ordered the above radiologic testing and reviewed the results    PROCEDURES  Procedures      PROGRESS AND CONSULTS  ED Course as of Mar 17 1749   Wed Mar 17, 2021   1445 Patient updated on results of labs and imaging so far.  Was waiting for results of ultrasound prior to ordering further imaging, however the read is taking a while.  Discussed ultrasound with sonographer and she informed me that patient's ovaries have good blood flow bilaterally and she did not see anything particularly alarming.  Given patient's microscopic hematuria I suspect that she may have a kidney stone.  We will send her for CT abdomen pelvis stone  protocol.  Her pain has been well controlled since fentanyl.    [KS]   1710 CONSULT  Discussed case with JANETH Fernandez with gynecology.  Reviewed history, exam, results and treatments.  Discussed concerns for PATSS and plan of care.  Joyce states Dr. Ramírez would probably be able to see patient in the office over the next couple of days.        [KS]   1720 Discussed all results of CT, ultrasound, and lab work with patient.  Her CT and pelvic ultrasound both show a collection of fluid within the uterus that could be associated with post ablation tubal sterilization syndrome (PATSS).  She does have a small left renal stone, and considering hematuria and no further pain, I think we also need to consider the possibility that she passed a stone while she was in the department.  Patient has had no recurrence of pain since fentanyl was administered at 1318, her vitals here are completely normal, lab work is reassuring.  I have discussed that she should follow-up with Dr. Ramírez in the office over the next couple of days to see if PATSS is a reasonable diagnosis for her pain.  Otherwise follow-up with PCP.  Discussed return to ER warnings.  Patient verbalizes understanding and is agreeable with this plan.    [KS]      ED Course User Index  [KS] Kari Pickering, SOUTH           MEDICAL DECISION MAKING    MDM     My differential diagnosis for abdominal pain includes but is not limited to:  Gastritis, gastroenteritis, peptic ulcer disease, GERD, esophageal perforation, acute appendicitis, mesenteric adenitis, Meckel’s diverticulum, epiploic appendagitis, diverticulitis, colon cancer, ulcerative colitis, Crohn’s disease, intussusception, small bowel obstruction, adhesions, ischemic bowel, perforated viscus, ileus, obstipation, biliary colic, cholecystitis, cholelithiasis, Goyo-Nahid Sandeep, hepatitis, pancreatitis, common bile duct obstruction, cholangitis, bile leak, splenic trauma, splenic rupture, splenic  infarction, splenic abscess, abdominal abscess, ascites, spontaneous bacterial peritonitis, hernia, UTI, cystitis,ureterolithiasis, urinary obstruction, ovarian cyst, torsion, pregnancy, ectopic pregnancy, PID, pelvic abscess, mittelschmerz, endometriosis, AAA, myocardial infarction, pneumonia, cancer, porphyria, DKA, medications, sickle cell, viral syndrome, zoster      DIAGNOSIS  Final diagnoses:   Post endometrial ablation syndrome   Left lower quadrant pain   Left renal stone       Latest Documented Vital Signs:  As of 17:49 EDT  BP- 121/84 HR- 72 Temp- 98.1 °F (36.7 °C) (Oral) O2 sat- 100%    DISPOSITION  Patient discharged home in care of .    Discussed pertinent labs and imaging findings with the patient/family.  Patient/Family voiced understanding of need to follow-up for recheck, further testing as needed.  Return to the emergency Department warnings were given.         Medication List      No changes were made to your prescriptions during this visit.             Follow-up Information     Nora Ramírez MD. Call in 1 day.    Specialties: Obstetrics and Gynecology, Gynecology  Why: To schedule follow-up appointment  Contact information:  [address] [facility] [address]  [place] [address] [phone]                     Dictated utilizing Dragon dictation     Kari Pickering PA-C  03/17/21 9613

## 2021-03-17 NOTE — DISCHARGE INSTRUCTIONS
Return to the emergency department with worsening symptoms, or as needed with emergent concerns.  You may take ibuprofen, your prescribed pain medication, or try applying heat for pain relief.

## 2021-03-18 ENCOUNTER — TELEPHONE (OUTPATIENT)
Dept: OBSTETRICS AND GYNECOLOGY | Facility: CLINIC | Age: 56
End: 2021-03-18

## 2021-03-18 NOTE — TELEPHONE ENCOUNTER
Patient calling states she was in the ER yesterday with LLQ pain. Would you like to work her in somewhere to see you?

## 2021-03-23 NOTE — TELEPHONE ENCOUNTER
Patient stated she found another provider while waiting to be scheduled-appointment in our office no longer needed

## 2021-03-23 NOTE — TELEPHONE ENCOUNTER
Pt did not specify during phone call and I was not aware she was a preexisting patient so I just cancelled her appointments for this week and left the annual in may

## 2021-05-13 ENCOUNTER — HOSPITAL ENCOUNTER (OUTPATIENT)
Dept: MAMMOGRAPHY | Facility: HOSPITAL | Age: 56
Discharge: HOME | End: 2021-05-13
Admitting: OBSTETRICS & GYNECOLOGY

## 2021-05-13 DIAGNOSIS — Z12.39 SCREENING BREAST EXAMINATION: ICD-10-CM

## 2021-05-13 PROCEDURE — 77063 BREAST TOMOSYNTHESIS BI: CPT

## 2021-05-13 PROCEDURE — 77067 SCR MAMMO BI INCL CAD: CPT

## 2022-05-23 ENCOUNTER — TRANSCRIBE ORDERS (OUTPATIENT)
Dept: ADMINISTRATIVE | Facility: HOSPITAL | Age: 57
End: 2022-05-23

## 2022-05-23 DIAGNOSIS — Z12.31 VISIT FOR SCREENING MAMMOGRAM: Primary | ICD-10-CM

## 2022-05-24 ENCOUNTER — HOSPITAL ENCOUNTER (OUTPATIENT)
Dept: MAMMOGRAPHY | Facility: HOSPITAL | Age: 57
Discharge: HOME | End: 2022-05-24
Admitting: OBSTETRICS & GYNECOLOGY

## 2022-05-24 DIAGNOSIS — Z12.31 VISIT FOR SCREENING MAMMOGRAM: ICD-10-CM

## 2022-05-24 PROCEDURE — 77067 SCR MAMMO BI INCL CAD: CPT

## 2022-05-24 PROCEDURE — 77063 BREAST TOMOSYNTHESIS BI: CPT

## 2022-06-28 ENCOUNTER — OFFICE VISIT (OUTPATIENT)
Dept: OBSTETRICS AND GYNECOLOGY | Facility: CLINIC | Age: 57
End: 2022-06-28

## 2022-06-28 VITALS
WEIGHT: 134 LBS | BODY MASS INDEX: 22.88 KG/M2 | HEIGHT: 64 IN | DIASTOLIC BLOOD PRESSURE: 78 MMHG | SYSTOLIC BLOOD PRESSURE: 128 MMHG

## 2022-06-28 DIAGNOSIS — Z11.51 SPECIAL SCREENING EXAMINATION FOR HUMAN PAPILLOMAVIRUS (HPV): ICD-10-CM

## 2022-06-28 DIAGNOSIS — N94.10 DYSPAREUNIA IN FEMALE: ICD-10-CM

## 2022-06-28 DIAGNOSIS — Z01.419 PAP SMEAR, LOW-RISK: ICD-10-CM

## 2022-06-28 DIAGNOSIS — Z87.42 HISTORY OF ABNORMAL CERVICAL PAPANICOLAOU SMEAR: ICD-10-CM

## 2022-06-28 DIAGNOSIS — Z01.419 ROUTINE GYNECOLOGICAL EXAMINATION: Primary | ICD-10-CM

## 2022-06-28 DIAGNOSIS — M85.80 OSTEOPENIA, SENILE: ICD-10-CM

## 2022-06-28 LAB
BILIRUB BLD-MCNC: NEGATIVE MG/DL
CLARITY, POC: CLEAR
COLOR UR: YELLOW
GLUCOSE UR STRIP-MCNC: NEGATIVE MG/DL
KETONES UR QL: NEGATIVE
LEUKOCYTE EST, POC: NEGATIVE
NITRITE UR-MCNC: NEGATIVE MG/ML
PH UR: 8 [PH] (ref 5–8)
PROT UR STRIP-MCNC: ABNORMAL MG/DL
RBC # UR STRIP: NEGATIVE /UL
SP GR UR: 1.02 (ref 1–1.03)
UROBILINOGEN UR QL: NORMAL

## 2022-06-28 PROCEDURE — 81002 URINALYSIS NONAUTO W/O SCOPE: CPT | Performed by: NURSE PRACTITIONER

## 2022-06-28 PROCEDURE — 99396 PREV VISIT EST AGE 40-64: CPT | Performed by: NURSE PRACTITIONER

## 2022-06-28 RX ORDER — PROGESTERONE 100 MG/1
100 CAPSULE ORAL DAILY
Qty: 30 CAPSULE | Refills: 11 | Status: SHIPPED | OUTPATIENT
Start: 2022-06-28 | End: 2022-06-28

## 2022-06-28 RX ORDER — PROGESTERONE 100 MG/1
100 CAPSULE ORAL DAILY
Qty: 30 CAPSULE | Refills: 11 | Status: SHIPPED | OUTPATIENT
Start: 2022-06-28 | End: 2022-06-29 | Stop reason: SDUPTHER

## 2022-06-28 RX ORDER — IBUPROFEN 200 MG
400 TABLET ORAL
COMMUNITY

## 2022-06-28 RX ORDER — TRIAMCINOLONE ACETONIDE 55 UG/1
2 SPRAY, METERED NASAL DAILY
COMMUNITY
Start: 2022-03-29 | End: 2023-03-29

## 2022-06-28 RX ORDER — MONTELUKAST SODIUM 10 MG/1
10 TABLET ORAL
COMMUNITY
Start: 2022-03-29 | End: 2022-09-25

## 2022-06-28 RX ORDER — DIPHENOXYLATE HYDROCHLORIDE AND ATROPINE SULFATE 2.5; .025 MG/1; MG/1
1 TABLET ORAL DAILY
COMMUNITY

## 2022-06-28 RX ORDER — ESTRADIOL 0.03 MG/D
1 FILM, EXTENDED RELEASE TRANSDERMAL 2 TIMES WEEKLY
Qty: 8 PATCH | Refills: 11 | Status: SHIPPED | OUTPATIENT
Start: 2022-06-30

## 2022-06-28 NOTE — PROGRESS NOTES
"GYN Annual Exam     Chief Complaint   Patient presents with   • Gynecologic Exam       Rosemary Sharma is a 57 y.o. female who presents for annual well woman exam. She is S/P endometrial ablation and is uncertain the time of menopause.  She denies vaginal spotting or discharge. She does not do SBE monthly on a regular basis.     She was using estrogen patches in efforts to help with pain during sexual intercourse. She reports the pain \"completely went away \" while on the estrogen tablet. She recently stopped the estrogen > 1 year ago after talking with another GYN provider. She feels like the pain with SIC is returning. States it feels like a \"burn or rubbing\" and \"I have to grit my teeth to get through it\" when discussing penetration.     OB History        1    Para        Term                AB   1    Living   0       SAB        IAB   1    Ectopic        Molar        Multiple        Live Births              Obstetric Comments   1 VIP           Menopausal: Uncertain d/t ablation   Mammogram:   Dexa scan:  Osteopenia, low risk for fracture   Colonoscopy: Due next year   Last Pap : - Neg/HPV +.   History of abnormal Pap smear: yes -   Family history of uterine, colon or ovarian cancer: yes - MG  Family history of breast cancer: no  History of abnormal mammogram: no        Past Medical History:   Diagnosis Date   • Abnormal Pap smear of cervix 2019    nl pap + HPV   • Osteopenia    • Seasonal allergies 2019       Past Surgical History:   Procedure Laterality Date   • CYST REMOVAL      arm & leg   • D & C WITH SUCTION      VIP x 1   • ENDOMETRIAL ABLATION     • EYE SURGERY     • TUBAL ABDOMINAL LIGATION           Current Outpatient Medications:   •  calcium carbonate-cholecalciferol 500-400 MG-UNIT tablet tablet, Take  by mouth Daily., Disp: , Rfl:   •  cetirizine (zyrTEC) 10 MG tablet, Take 10 mg by mouth Daily., Disp: , Rfl:   •  Collagen-Vitamin C-Biotin (COLLAGEN 1500/C " "PO), Take  by mouth., Disp: , Rfl:   •  ibuprofen (ADVIL,MOTRIN) 200 MG tablet, Take 400 mg by mouth., Disp: , Rfl:   •  montelukast (SINGULAIR) 10 MG tablet, Take 10 mg by mouth., Disp: , Rfl:   •  multivitamin (THERAGRAN) tablet tablet, Take 1 tablet by mouth Daily., Disp: , Rfl:   •  Triamcinolone Acetonide (NASACORT) 55 MCG/ACT nasal inhaler, 2 sprays into the nostril(s) as directed by provider Daily., Disp: , Rfl:     No Known Allergies    Social History     Tobacco Use   • Smoking status: Never Smoker   • Smokeless tobacco: Never Used   Substance Use Topics   • Alcohol use: No   • Drug use: No       Family History   Problem Relation Age of Onset   • Colon cancer Maternal Grandfather    • Breast cancer Neg Hx    • Ovarian cancer Neg Hx    • Deep vein thrombosis Neg Hx    • Pulmonary embolism Neg Hx    • Uterine cancer Neg Hx        Review of Systems   Constitutional: Negative.    Respiratory: Negative.    Cardiovascular: Negative.    Gastrointestinal: Negative.    Endocrine: Negative.    Genitourinary: Positive for vaginal pain.   Musculoskeletal: Negative.    Skin: Negative.    Neurological: Negative.    Psychiatric/Behavioral: Negative.        /78   Ht 162.6 cm (64.02\")   Wt 60.8 kg (134 lb)   Breastfeeding No   BMI 22.99 kg/m²     Physical Exam  Vitals reviewed.   Constitutional:       Appearance: She is well-developed.   Neck:      Thyroid: No thyromegaly.   Cardiovascular:      Rate and Rhythm: Normal rate and regular rhythm.   Pulmonary:      Effort: Pulmonary effort is normal.      Breath sounds: Normal breath sounds.   Chest:   Breasts:      Right: No inverted nipple, mass, nipple discharge, skin change or tenderness.      Left: No inverted nipple, mass, nipple discharge, skin change or tenderness.       Abdominal:      General: Bowel sounds are normal.      Palpations: Abdomen is soft.   Genitourinary:     Exam position: Supine.      Labia:         Right: No rash, tenderness, lesion or " injury.         Left: No rash, tenderness, lesion or injury.       Vagina: No signs of injury and foreign body. No vaginal discharge, erythema, tenderness or bleeding.      Cervix: No cervical motion tenderness, discharge or friability.      Uterus: Not deviated, not enlarged, not fixed and not tender.       Adnexa:         Right: No mass, tenderness or fullness.          Left: No mass, tenderness or fullness.        Rectum: Normal.   Musculoskeletal:      Cervical back: Normal range of motion and neck supple.   Skin:     General: Skin is warm and dry.   Neurological:      General: No focal deficit present.      Mental Status: She is alert and oriented to person, place, and time.   Psychiatric:         Mood and Affect: Mood normal.         Behavior: Behavior normal.            Assessment     1) GYN annual well woman exam.   2) Postmenopausal   3) Osteopenia  4) Dyspareunia  5) HRT       Plan     1) Breast Health - Clinical breast exam & mammogram yearly, Self breast awareness. Schedule screening mammogram.   2) Pap - Collected today. H/O abnormal pap.   3) Osteopenia- Check Dexa scan. Continue Vit D and Ca+.   4) Dyspareunia- Vaginal pain, not pelvic. Improved while on estrogen. Pt desires to restart estrogen.   5) HRT- Plan to restart Vivelle dot at 0.025. Pt was taking 0.05. She understands she can not have unopposed estrogen d/t an intact uterus so ERX sent for progesterone. Patient counseled that hormone treatment should be used to help with specific symptoms related to menopause such as hot flashes, night sweats and vaginal atrophy. Hormones should not be given for “general wellbeing” or to avoid aging. The lowest dose hormone that treats symptoms should be used for the shortest about of time possible.  Although estrogen is the most effective treatment for hot flashes, other non hormonal options exist (such as Brisdelle or venlafaxine) and should also be considered.  Anyone with an intact uterus should also  receive progestogen to prevent uterine overgrowth that can lead to uterine cancer.  All hormone therapy, whether it is synthetic or bio identical, can lead to increased risk of stroke, heart attack and thromboembolic diseases.  These adverse events are more likely to develop in the first year of use and in patients who are older than the typical menopausal age.  Risks of estrogen dependent cancers such as breast cancer increase with prolonged use.  Attempts to wean hormone therapy should be discussed annually and particularly after three to five years of use.  Any side effects such as vaginal bleeding or pain should be reported immediately.  6) Smoking status- non smoker  7) Colon health - screening colonoscopy recommended if not up to date  8) BMI is within normal parameters. No other follow-up for BMI required.  9) Bone health - Weight bearing exercise, dietary calcium recommendations and vitamin D  reviewed.     Follow up prn and one year    JANETH Ceballos  6/28/2022  11:48 EDT

## 2022-06-29 RX ORDER — PROGESTERONE 100 MG/1
100 CAPSULE ORAL DAILY
Qty: 30 CAPSULE | Refills: 11 | Status: SHIPPED | OUTPATIENT
Start: 2022-06-29

## 2022-06-30 LAB
CYTOLOGIST CVX/VAG CYTO: NORMAL
CYTOLOGY CVX/VAG DOC CYTO: NORMAL
CYTOLOGY CVX/VAG DOC THIN PREP: NORMAL
DX ICD CODE: NORMAL
HIV 1 & 2 AB SER-IMP: NORMAL
HPV I/H RISK 4 DNA CVX QL PROBE+SIG AMP: NEGATIVE
OTHER STN SPEC: NORMAL
STAT OF ADQ CVX/VAG CYTO-IMP: NORMAL

## 2023-08-11 ENCOUNTER — TRANSCRIBE ORDERS (OUTPATIENT)
Dept: ADMINISTRATIVE | Facility: HOSPITAL | Age: 58
End: 2023-08-11
Payer: COMMERCIAL

## 2023-08-11 DIAGNOSIS — Z12.31 ENCOUNTER FOR SCREENING MAMMOGRAM FOR BREAST CANCER: Primary | ICD-10-CM

## 2023-08-30 ENCOUNTER — HOSPITAL ENCOUNTER (OUTPATIENT)
Dept: MAMMOGRAPHY | Facility: HOSPITAL | Age: 58
Discharge: HOME | End: 2023-08-30
Admitting: FAMILY MEDICINE
Payer: COMMERCIAL

## 2023-08-30 DIAGNOSIS — Z12.31 ENCOUNTER FOR SCREENING MAMMOGRAM FOR BREAST CANCER: ICD-10-CM

## 2023-08-30 PROCEDURE — 77063 BREAST TOMOSYNTHESIS BI: CPT

## 2023-08-30 PROCEDURE — 77067 SCR MAMMO BI INCL CAD: CPT

## 2023-10-23 ENCOUNTER — OFFICE VISIT (OUTPATIENT)
Dept: OBSTETRICS AND GYNECOLOGY | Facility: CLINIC | Age: 58
End: 2023-10-23
Payer: COMMERCIAL

## 2023-10-23 VITALS
HEIGHT: 64 IN | BODY MASS INDEX: 23.52 KG/M2 | SYSTOLIC BLOOD PRESSURE: 98 MMHG | WEIGHT: 137.8 LBS | DIASTOLIC BLOOD PRESSURE: 68 MMHG

## 2023-10-23 DIAGNOSIS — Z87.42 HISTORY OF ABNORMAL CERVICAL PAPANICOLAOU SMEAR: ICD-10-CM

## 2023-10-23 DIAGNOSIS — Z01.419 ROUTINE GYNECOLOGICAL EXAMINATION: Primary | ICD-10-CM

## 2023-10-23 DIAGNOSIS — Z11.51 SPECIAL SCREENING EXAMINATION FOR HUMAN PAPILLOMAVIRUS (HPV): ICD-10-CM

## 2023-10-23 LAB
BILIRUB BLD-MCNC: NEGATIVE MG/DL
CLARITY, POC: CLEAR
COLOR UR: YELLOW
GLUCOSE UR STRIP-MCNC: NEGATIVE MG/DL
KETONES UR QL: NEGATIVE
LEUKOCYTE EST, POC: NEGATIVE
NITRITE UR-MCNC: NEGATIVE MG/ML
PH UR: 5 [PH] (ref 5–8)
PROT UR STRIP-MCNC: NEGATIVE MG/DL
RBC # UR STRIP: NEGATIVE /UL
SP GR UR: 1 (ref 1–1.03)
UROBILINOGEN UR QL: NORMAL

## 2023-10-23 RX ORDER — TRIAMCINOLONE ACETONIDE 55 UG/1
SPRAY, METERED NASAL
COMMUNITY

## 2023-10-23 RX ORDER — MECLIZINE HYDROCHLORIDE 25 MG/1
25 TABLET ORAL
COMMUNITY
Start: 2022-11-10 | End: 2023-11-10

## 2023-10-23 NOTE — PROGRESS NOTES
"GYN Annual Exam     Chief Complaint   Patient presents with   • Gynecologic Exam       Rosemary Sharma is a 58 y.o. female who presents for annual well woman exam. She is S/P endometrial ablation and is uncertain the time of menopause.  Denies VB. No HRT.  She does not do SBE monthly on a regular basis.     She has no desires to have sex. She stopped her HRT. She is having hot flashes. She report excruciating pain with sex. She has tried vaginal estrogen with no relief in her symptoms. Reports her vagina feels like \"an Citizen of [place] burn.\" She reports she is lubricated well.         OB History          1    Para        Term                AB   1    Living   0         SAB        IAB   1    Ectopic        Molar        Multiple        Live Births              Obstetric Comments   1 VIP             Menopausal: Uncertain d/t ablation   Mammogram:   Dexa scan:  Osteopenia  Colonoscopy: Due   Last Pap :  NIL/HPV neg.   History of abnormal Pap smear: yes -   Family history of uterine, colon or ovarian cancer: yes -   Family history of breast cancer: no  History of abnormal mammogram: no        Past Medical History:   Diagnosis Date   • Abnormal Pap smear of cervix 2019    nl pap + HPV   • Osteopenia    • Seasonal allergies 2019       Past Surgical History:   Procedure Laterality Date   • CYST REMOVAL      arm & leg   • D & C WITH SUCTION      VIP x 1   • ENDOMETRIAL ABLATION     • EYE SURGERY     • TUBAL ABDOMINAL LIGATION           Current Outpatient Medications:   •  calcium carbonate-cholecalciferol 500-400 MG-UNIT tablet tablet, Take  by mouth Daily., Disp: , Rfl:   •  cetirizine (zyrTEC) 10 MG tablet, Take 1 tablet by mouth Daily., Disp: , Rfl:   •  Collagen-Vitamin C-Biotin (COLLAGEN 1500/C PO), Take  by mouth., Disp: , Rfl:   •  ibuprofen (ADVIL,MOTRIN) 200 MG tablet, Take 2 tablets by mouth., Disp: , Rfl:   •  meclizine (ANTIVERT) 25 MG tablet, Take 1 tablet by mouth., Disp: , " "Rfl:   •  multivitamin (THERAGRAN) tablet tablet, Take 1 tablet by mouth Daily., Disp: , Rfl:   •  Triamcinolone Acetonide (Nasacort Allergy 24HR) 55 MCG/ACT nasal inhaler, , Disp: , Rfl:     No Known Allergies    Social History     Tobacco Use   • Smoking status: Never   • Smokeless tobacco: Never   Substance Use Topics   • Alcohol use: No   • Drug use: No       Family History   Problem Relation Age of Onset   • Colon cancer Maternal Grandfather    • Breast cancer Neg Hx    • Ovarian cancer Neg Hx    • Deep vein thrombosis Neg Hx    • Pulmonary embolism Neg Hx    • Uterine cancer Neg Hx        Review of Systems   Constitutional: Negative.    Respiratory: Negative.     Cardiovascular: Negative.    Gastrointestinal: Negative.    Endocrine: Negative.    Genitourinary:  Positive for dyspareunia and vaginal pain.        Decreased sex drive    Musculoskeletal: Negative.    Skin: Negative.    Neurological: Negative.    Psychiatric/Behavioral: Negative.         BP 98/68   Ht 162.6 cm (64\")   Wt 62.5 kg (137 lb 12.8 oz)   BMI 23.65 kg/m²     Physical Exam  Vitals reviewed.   Constitutional:       Appearance: She is well-developed.   Neck:      Thyroid: No thyromegaly.   Cardiovascular:      Rate and Rhythm: Normal rate and regular rhythm.   Pulmonary:      Effort: Pulmonary effort is normal.      Breath sounds: Normal breath sounds.   Chest:   Breasts:     Right: No inverted nipple, mass, nipple discharge, skin change or tenderness.      Left: No inverted nipple, mass, nipple discharge, skin change or tenderness.   Abdominal:      General: Bowel sounds are normal.      Palpations: Abdomen is soft.   Genitourinary:     Exam position: Supine.      Labia:         Right: No rash, tenderness, lesion or injury.         Left: No rash, tenderness, lesion or injury.       Vagina: No signs of injury and foreign body. No vaginal discharge, erythema, tenderness or bleeding.      Cervix: No cervical motion tenderness, discharge or " friability.      Uterus: Not deviated, not enlarged, not fixed and not tender.       Adnexa:         Right: No mass, tenderness or fullness.          Left: No mass, tenderness or fullness.        Rectum: Normal.      Comments: +LPS  Musculoskeletal:      Cervical back: Normal range of motion and neck supple.   Skin:     General: Skin is warm and dry.   Neurological:      General: No focal deficit present.      Mental Status: She is alert and oriented to person, place, and time.   Psychiatric:         Mood and Affect: Mood normal.         Behavior: Behavior normal.          Assessment     1) GYN annual well woman exam.   2) H/O abnormal pap   3) Osteopenia  4) Dyspareunia         Plan     1) Breast Health - Clinical breast exam & mammogram yearly, Self breast awareness. Schedule screening mammogram.   2) Pap - Collected today. H/O abnormal pap.   3) Osteopenia- Check Dexa scan. Continue Vit D and Ca+.   4) Dyspareunia and decreased libido- Disc decreased libido.  Vaginal pain, not pelvic. Declines vaginal estrogen. Rec xtra virgin olive oil or coconut oil. Rec pelvic floor PT, pt uncertain.   5)  Smoking status- non smoker  6) Colon health - screening colonoscopy recommended if not up to date. Declines ref.  7) Body mass index is 23.65 kg/m².      Follow up prn and one year    JANETH Ceballos  10/23/2023  11:48 EDT

## 2024-10-07 ENCOUNTER — TRANSCRIBE ORDERS (OUTPATIENT)
Dept: ADMINISTRATIVE | Facility: HOSPITAL | Age: 59
End: 2024-10-07
Payer: COMMERCIAL

## 2024-10-07 DIAGNOSIS — Z12.31 BREAST CANCER SCREENING BY MAMMOGRAM: Primary | ICD-10-CM

## 2024-10-24 ENCOUNTER — OFFICE VISIT (OUTPATIENT)
Dept: OBSTETRICS AND GYNECOLOGY | Facility: CLINIC | Age: 59
End: 2024-10-24
Payer: COMMERCIAL

## 2024-10-24 VITALS
HEIGHT: 64 IN | BODY MASS INDEX: 22.88 KG/M2 | WEIGHT: 134 LBS | SYSTOLIC BLOOD PRESSURE: 108 MMHG | DIASTOLIC BLOOD PRESSURE: 72 MMHG

## 2024-10-24 DIAGNOSIS — Z01.419 ROUTINE GYNECOLOGICAL EXAMINATION: Primary | ICD-10-CM

## 2024-10-24 DIAGNOSIS — Z01.419 CERVICAL SMEAR, AS PART OF ROUTINE GYNECOLOGICAL EXAMINATION: ICD-10-CM

## 2024-10-24 DIAGNOSIS — Z11.51 SPECIAL SCREENING EXAMINATION FOR HUMAN PAPILLOMAVIRUS (HPV): ICD-10-CM

## 2024-10-24 DIAGNOSIS — M85.80 OSTEOPENIA AFTER MENOPAUSE: ICD-10-CM

## 2024-10-24 DIAGNOSIS — Z78.0 OSTEOPENIA AFTER MENOPAUSE: ICD-10-CM

## 2024-10-24 LAB
BILIRUB BLD-MCNC: NEGATIVE MG/DL
CLARITY, POC: CLEAR
COLOR UR: YELLOW
GLUCOSE UR STRIP-MCNC: NEGATIVE MG/DL
KETONES UR QL: NEGATIVE
LEUKOCYTE EST, POC: NEGATIVE
NITRITE UR-MCNC: NEGATIVE MG/ML
PH UR: 6.5 [PH] (ref 5–8)
PROT UR STRIP-MCNC: ABNORMAL MG/DL
RBC # UR STRIP: NEGATIVE /UL
SP GR UR: 1 (ref 1–1.03)
UROBILINOGEN UR QL: NORMAL

## 2024-10-24 NOTE — PROGRESS NOTES
GYN Annual Exam     Chief Complaint   Patient presents with    Gynecologic Exam       Rosemary Sharma is a 59 y.o. female who presents for annual well woman exam. She is S/P endometrial ablation and is uncertain the time of menopause.  Denies VB. No HRT.  She does not do SBE monthly on a regular basis.     She is not sexually active. She has some issues with vaginal irritation.       OB History          1    Para        Term                AB   1    Living   0         SAB        IAB   1    Ectopic        Molar        Multiple        Live Births              Obstetric Comments   1 VIP             Menopausal: Uncertain d/t ablation   Mammogram:   Dexa scan:  Osteopenia  Colonoscopy: Due   Last Pap : 10/23 NIL/HPV neg.   History of abnormal Pap smear: yes -   Family history of uterine, colon or ovarian cancer: yes - MGF  Family history of breast cancer: no  History of abnormal mammogram: no        Past Medical History:   Diagnosis Date    Abnormal Pap smear of cervix 2019    nl pap + HPV    Osteopenia     Seasonal allergies 2019       Past Surgical History:   Procedure Laterality Date    CYST REMOVAL      arm & leg    D & C WITH SUCTION      VIP x 1    ENDOMETRIAL ABLATION      EYE SURGERY      TUBAL ABDOMINAL LIGATION           Current Outpatient Medications:     calcium carbonate-cholecalciferol 500-400 MG-UNIT tablet tablet, Take  by mouth Daily., Disp: , Rfl:     cetirizine (zyrTEC) 10 MG tablet, Take 1 tablet by mouth Daily., Disp: , Rfl:     Collagen-Vitamin C-Biotin (COLLAGEN 1500/C PO), Take  by mouth., Disp: , Rfl:     ibuprofen (ADVIL,MOTRIN) 200 MG tablet, Take 2 tablets by mouth., Disp: , Rfl:     multivitamin (THERAGRAN) tablet tablet, Take 1 tablet by mouth Daily., Disp: , Rfl:     Triamcinolone Acetonide (Nasacort Allergy 24HR) 55 MCG/ACT nasal inhaler, , Disp: , Rfl:     No Known Allergies    Social History     Tobacco Use    Smoking status: Never    Smokeless  "tobacco: Never   Substance Use Topics    Alcohol use: No    Drug use: No       Family History   Problem Relation Age of Onset    Colon cancer Maternal Grandfather     Breast cancer Neg Hx     Ovarian cancer Neg Hx     Deep vein thrombosis Neg Hx     Pulmonary embolism Neg Hx     Uterine cancer Neg Hx        Review of Systems   Constitutional: Negative.    Respiratory: Negative.     Cardiovascular: Negative.    Gastrointestinal: Negative.    Endocrine: Negative.    Genitourinary:  Negative for dyspareunia and vaginal pain.   Musculoskeletal: Negative.    Skin: Negative.    Neurological: Negative.    Psychiatric/Behavioral: Negative.         /72   Ht 162.6 cm (64\")   Wt 60.8 kg (134 lb)   BMI 23.00 kg/m²     Physical Exam  Vitals reviewed.   Constitutional:       Appearance: She is well-developed.   Neck:      Thyroid: No thyromegaly.   Cardiovascular:      Rate and Rhythm: Normal rate and regular rhythm.   Pulmonary:      Effort: Pulmonary effort is normal.      Breath sounds: Normal breath sounds.   Chest:   Breasts:     Right: No inverted nipple, mass, nipple discharge, skin change or tenderness.      Left: No inverted nipple, mass, nipple discharge, skin change or tenderness.   Abdominal:      General: Bowel sounds are normal.      Palpations: Abdomen is soft.   Genitourinary:     Exam position: Supine.      Labia:         Right: No rash, tenderness, lesion or injury.         Left: No rash, tenderness, lesion or injury.       Vagina: No signs of injury and foreign body. No vaginal discharge, erythema, tenderness or bleeding.      Cervix: No cervical motion tenderness, discharge or friability.      Uterus: Not deviated, not enlarged, not fixed and not tender.       Adnexa:         Right: No mass, tenderness or fullness.          Left: No mass, tenderness or fullness.        Rectum: Normal.      Comments: +LPS  Musculoskeletal:      Cervical back: Normal range of motion and neck supple.   Skin:     General: " Skin is warm and dry.   Neurological:      General: No focal deficit present.      Mental Status: She is alert and oriented to person, place, and time.   Psychiatric:         Mood and Affect: Mood normal.         Behavior: Behavior normal.            Assessment     1) GYN annual well woman exam.   2) H/O abnormal pap   3) Osteopenia  4) vulvar atrophy            Plan     1) Breast Health - Clinical breast exam & mammogram yearly, Self breast awareness. Schedule screening mammogram- 12/24  2) Pap - Collected today. H/O abnormal pap.   3) Osteopenia- Check Dexa scan. Continue Vit D and Ca+. Enc weight bearing exercises.   4) Vulvar atrophy.  Vaginal pain, not pelvic. Declines vaginal estrogen. Rec xtra virgin olive oil or coconut oil.    5)  Smoking status- non smoker  6) Colon health - screening colonoscopy recommended if not up to date. Declines ref.  7) Body mass index is 23 kg/m².      Follow up prn and one year    JANETH Ceballos  10/24/2024  12:22 EDT

## 2024-10-30 LAB
CYTOLOGIST CVX/VAG CYTO: NORMAL
CYTOLOGY CVX/VAG DOC CYTO: NORMAL
CYTOLOGY CVX/VAG DOC THIN PREP: NORMAL
DX ICD CODE: NORMAL
HPV I/H RISK 4 DNA CVX QL PROBE+SIG AMP: NEGATIVE
Lab: NORMAL
OTHER STN SPEC: NORMAL
STAT OF ADQ CVX/VAG CYTO-IMP: NORMAL

## 2024-11-12 ENCOUNTER — APPOINTMENT (OUTPATIENT)
Dept: BONE DENSITY | Facility: HOSPITAL | Age: 59
End: 2024-11-12
Payer: COMMERCIAL

## 2024-11-12 DIAGNOSIS — M85.80 OSTEOPENIA AFTER MENOPAUSE: ICD-10-CM

## 2024-11-12 DIAGNOSIS — Z78.0 OSTEOPENIA AFTER MENOPAUSE: ICD-10-CM

## 2024-11-12 PROCEDURE — 77080 DXA BONE DENSITY AXIAL: CPT

## 2025-01-22 ENCOUNTER — HOSPITAL ENCOUNTER (OUTPATIENT)
Dept: MAMMOGRAPHY | Facility: HOSPITAL | Age: 60
Discharge: HOME | End: 2025-01-22
Admitting: OBSTETRICS & GYNECOLOGY
Payer: COMMERCIAL

## 2025-01-22 DIAGNOSIS — Z12.31 BREAST CANCER SCREENING BY MAMMOGRAM: ICD-10-CM

## 2025-01-22 PROCEDURE — 77067 SCR MAMMO BI INCL CAD: CPT

## 2025-01-22 PROCEDURE — 77063 BREAST TOMOSYNTHESIS BI: CPT

## 2025-01-22 PROCEDURE — 77067 SCR MAMMO BI INCL CAD: CPT | Performed by: RADIOLOGY

## 2025-01-22 PROCEDURE — 77063 BREAST TOMOSYNTHESIS BI: CPT | Performed by: RADIOLOGY
